# Patient Record
(demographics unavailable — no encounter records)

---

## 2024-11-04 NOTE — HISTORY OF PRESENT ILLNESS
[de-identified] : 66 yo male with history of prostate ca s/p robot assisted prostatectomy as well as inguinal hernia s/p lap repair c/b recurrence s/p open repair on 8/1/2024 who had a recent [facility] for [facility]. Mr. Munoz reports good appetite, regular BM's (2-3 times daily), denies abdominal pain/cramping, denies nausea/vomiting.  AVSS No acute distress Non labored respirations ABdomen soft, non tender, non distended  Patient with resolved SBO. All questions answered to Mr. Munoz's satisafction. Follow up with me PRN.